# Patient Record
Sex: FEMALE | URBAN - METROPOLITAN AREA
[De-identification: names, ages, dates, MRNs, and addresses within clinical notes are randomized per-mention and may not be internally consistent; named-entity substitution may affect disease eponyms.]

---

## 2023-06-28 ENCOUNTER — OUT OF OFFICE VISIT (OUTPATIENT)
Dept: URBAN - METROPOLITAN AREA MEDICAL CENTER 27 | Facility: MEDICAL CENTER | Age: 83
End: 2023-06-28
Payer: MEDICARE

## 2023-06-28 ENCOUNTER — LAB OUTSIDE AN ENCOUNTER (OUTPATIENT)
Dept: URBAN - METROPOLITAN AREA CLINIC 23 | Facility: CLINIC | Age: 83
End: 2023-06-28

## 2023-06-28 DIAGNOSIS — K29.50 ANTRAL GASTRITIS: ICD-10-CM

## 2023-06-28 DIAGNOSIS — B96.81 BACTERIAL INFECTION DUE TO H. PYLORI: ICD-10-CM

## 2023-06-28 DIAGNOSIS — K21.9 ACID REFLUX: ICD-10-CM

## 2023-06-28 DIAGNOSIS — K31.89 ACHYLIA: ICD-10-CM

## 2023-06-28 DIAGNOSIS — R93.3 ABN FINDINGS-GI TRACT: ICD-10-CM

## 2023-06-28 DIAGNOSIS — R11.2 ACUTE NAUSEA WITH NONBILIOUS VOMITING: ICD-10-CM

## 2023-06-28 DIAGNOSIS — R10.84 ABDOMINAL CRAMPING, GENERALIZED: ICD-10-CM

## 2023-06-28 DIAGNOSIS — K20.80 ESOPHAGITIS DISSECANS SUPERFICIALIS: ICD-10-CM

## 2023-06-28 PROCEDURE — G8427 DOCREV CUR MEDS BY ELIG CLIN: HCPCS | Performed by: INTERNAL MEDICINE

## 2023-06-28 PROCEDURE — 44361 SMALL BOWEL ENDOSCOPY/BIOPSY: CPT | Performed by: INTERNAL MEDICINE

## 2023-06-28 PROCEDURE — 91110 GI TRC IMG INTRAL ESOPH-ILE: CPT | Performed by: INTERNAL MEDICINE

## 2023-06-28 PROCEDURE — 99222 1ST HOSP IP/OBS MODERATE 55: CPT | Performed by: INTERNAL MEDICINE

## 2023-06-29 ENCOUNTER — OUT OF OFFICE VISIT (OUTPATIENT)
Dept: URBAN - METROPOLITAN AREA MEDICAL CENTER 27 | Facility: MEDICAL CENTER | Age: 83
End: 2023-06-29
Payer: MEDICARE

## 2023-06-29 DIAGNOSIS — R93.3 ABN FINDINGS-GI TRACT: ICD-10-CM

## 2023-06-29 DIAGNOSIS — R11.2 ACUTE NAUSEA WITH NONBILIOUS VOMITING: ICD-10-CM

## 2023-06-29 DIAGNOSIS — R10.84 ABDOMINAL CRAMPING, GENERALIZED: ICD-10-CM

## 2023-06-29 LAB
LIPASE LEVEL: 359
PERFORMING LAB: (no result)

## 2023-06-29 PROCEDURE — 99232 SBSQ HOSP IP/OBS MODERATE 35: CPT | Performed by: NURSE PRACTITIONER

## 2023-06-30 ENCOUNTER — OUT OF OFFICE VISIT (OUTPATIENT)
Dept: URBAN - METROPOLITAN AREA MEDICAL CENTER 27 | Facility: MEDICAL CENTER | Age: 83
End: 2023-06-30
Payer: MEDICARE

## 2023-06-30 DIAGNOSIS — R10.10 ABDOMINAL WALL PAIN IN RIGHT UPPER QUADRANT: ICD-10-CM

## 2023-06-30 DIAGNOSIS — R93.3 ABN FINDINGS-GI TRACT: ICD-10-CM

## 2023-06-30 DIAGNOSIS — R11.2 ACUTE NAUSEA WITH NONBILIOUS VOMITING: ICD-10-CM

## 2023-06-30 PROCEDURE — 99232 SBSQ HOSP IP/OBS MODERATE 35: CPT | Performed by: NURSE PRACTITIONER

## 2023-07-10 ENCOUNTER — TELEPHONE ENCOUNTER (OUTPATIENT)
Dept: URBAN - METROPOLITAN AREA CLINIC 23 | Facility: CLINIC | Age: 83
End: 2023-07-10

## 2023-08-14 ENCOUNTER — CLAIMS CREATED FROM THE CLAIM WINDOW (OUTPATIENT)
Dept: URBAN - METROPOLITAN AREA CLINIC 23 | Facility: CLINIC | Age: 83
End: 2023-08-14

## 2023-08-14 ENCOUNTER — WEB ENCOUNTER (OUTPATIENT)
Dept: URBAN - METROPOLITAN AREA CLINIC 23 | Facility: CLINIC | Age: 83
End: 2023-08-14

## 2023-08-14 ENCOUNTER — CLAIMS CREATED FROM THE CLAIM WINDOW (OUTPATIENT)
Dept: URBAN - METROPOLITAN AREA CLINIC 23 | Facility: CLINIC | Age: 83
End: 2023-08-14
Payer: MEDICARE

## 2023-08-14 ENCOUNTER — OFFICE VISIT (OUTPATIENT)
Dept: URBAN - METROPOLITAN AREA CLINIC 23 | Facility: CLINIC | Age: 83
End: 2023-08-14

## 2023-08-14 VITALS
HEART RATE: 56 BPM | DIASTOLIC BLOOD PRESSURE: 84 MMHG | BODY MASS INDEX: 33.73 KG/M2 | SYSTOLIC BLOOD PRESSURE: 130 MMHG | HEIGHT: 63 IN | WEIGHT: 190.4 LBS | TEMPERATURE: 97.7 F

## 2023-08-14 DIAGNOSIS — A04.8 BACTERIAL INFECTION DUE TO H. PYLORI: ICD-10-CM

## 2023-08-14 DIAGNOSIS — K63.9 ABNORMALITY OF COLON: ICD-10-CM

## 2023-08-14 DIAGNOSIS — Z79.1 ENCNTR LONG-TERM NSAID USE: ICD-10-CM

## 2023-08-14 PROCEDURE — 99214 OFFICE O/P EST MOD 30 MIN: CPT | Performed by: INTERNAL MEDICINE

## 2023-08-14 NOTE — PHYSICAL EXAM CONSTITUTIONAL:
well developed, well nourished , in no acute distress , ambulating without difficulty , normal communication ability alert

## 2023-08-14 NOTE — HPI-TODAY'S VISIT:
82-year-old female presents for follow-up of hospital stay.  GI was consulted for abdominal pain and abnormal CT scan findings of dilated small bowel.  Work-up showed enteritis and large hiatal hernia, LA grade B esophagitis.  Biopsy came back positive for H. pylori.  She finished antibiotic about a month ago.  She is still on lansoprazole. She was on diclofenac daily for few years.  Currently it was stopped due to kidney issue. Currently she has no abdominal pain.  No GI symptoms.  She had abdominal hernia surgery during her stay, blaming that was cause for her abdominal pain.

## 2023-08-14 NOTE — PREVIOUS WORKUP REVIEWED
Reviewed External Medical Record  ENDOSCOPIES -[facility] 6/28/2023: Delayed gastric emptying.  Reach the cecum.  Inflammation in the mid to distal small bowel. -Push enteroscopy 6/28/2023: LA grade B esophagitis.  Large hiatal hernia.  Normal stomach.  Normal duodenum.  Normal jejunum. *Pathology: Duodenum-focal epithelial injury/erosive change.  Negative for celiac.  Gastric random-chronic active gastritis with foci of complete intestinal metaplasia, H. pylori positive.  GE junction-chronic active inflammation.  H. pylori.  Negative for intestinal metaplasia.   IMAGES -CT abdomen pelvis without contrast 7/20/2023: Severe right-sided hydroureteronephrosis.  Large hiatal hernia.  Hepatic cysts.  Prominence of CBD, 1.5 cm in size.  Normal spleen and pancreas.  Colonic diverticulosis.  No bowel obstruction.  Atherosclerotic calcification of the aortoiliac system.  Severe degenerative change in the spine.  Bilateral hip arthroplasties.  Fat-containing umbilical hernia. -CT abdomen pelvis without contrast 6/27/2023: No intrahepatic biliary ductal dilatation.  Normal gallbladder.  Extrahepatic bile duct is not dilated.  Normal spleen.  Normal pancreas.  Normal pancreatic duct.  Severe right-sided hydronephrosis.  Severe right hydroureter to the level of urinary bladder.  Moderate to large sized hiatal hernia.  A few mildly distended loops of small bowel in the left side of abdomen.  But no evidence of bowel obstruction.  A long segment tapering between mildly distended loops of small bowel in the normal caliber loops of small bowel.  Small fat-containing umbilical hernia.  Left inguinal hernia containing peritoneal fat and small amount of fluid.   LABS -Labs 6/27/2023: WBC 8.6, hemoglobin 17.2, platelet 364, sodium 135, potassium 3.9, glucose 139, BUN 23, creatinine 1.7.  Calcium 10.8.  Total protein 8.1, albumin 4.3, total bilirubin 0.6, alkaline phosphatase 66, AST 22, ALT 9.  Hemoglobin A1c 5.4.

## 2023-08-21 LAB
H PYLORI BREATH TEST: NOT DETECTED
H. PYLORI BREATH TEST: NOT DETECTED
INTERPRETATION: NOT DETECTED

## 2023-11-12 ENCOUNTER — DASHBOARD ENCOUNTERS (OUTPATIENT)
Age: 83
End: 2023-11-12